# Patient Record
Sex: MALE | Race: WHITE
[De-identification: names, ages, dates, MRNs, and addresses within clinical notes are randomized per-mention and may not be internally consistent; named-entity substitution may affect disease eponyms.]

---

## 2018-03-31 ENCOUNTER — HOSPITAL ENCOUNTER (EMERGENCY)
Dept: HOSPITAL 45 - C.EDB | Age: 23
Discharge: HOME | End: 2018-03-31
Payer: COMMERCIAL

## 2018-03-31 VITALS
BODY MASS INDEX: 22.34 KG/M2 | HEIGHT: 62.99 IN | WEIGHT: 126.1 LBS | WEIGHT: 126.1 LBS | HEIGHT: 62.99 IN | BODY MASS INDEX: 22.34 KG/M2

## 2018-03-31 VITALS
OXYGEN SATURATION: 95 % | DIASTOLIC BLOOD PRESSURE: 58 MMHG | TEMPERATURE: 98.78 F | SYSTOLIC BLOOD PRESSURE: 114 MMHG | HEART RATE: 96 BPM

## 2018-03-31 DIAGNOSIS — R50.9: Primary | ICD-10-CM

## 2018-03-31 DIAGNOSIS — R05: ICD-10-CM

## 2018-03-31 DIAGNOSIS — M79.1: ICD-10-CM

## 2018-03-31 DIAGNOSIS — J02.9: ICD-10-CM

## 2018-03-31 LAB — INFLUENZA B ANTIGEN: (no result)

## 2018-03-31 NOTE — DIAGNOSTIC IMAGING REPORT
TWO VIEW CHEST



CLINICAL HISTORY: Cough and fever.



FINDINGS: PA and lateral chest radiographs are compared to study dated

5/15/2015. The cardiomediastinal silhouette is unremarkable.  The lungs and

pleural spaces are clear. There is no pneumothorax. The bony thorax appears

intact.



IMPRESSION: No active disease in the chest.







Electronically signed by:  Sanjay Landin M.D.

3/31/2018 7:49 AM



Dictated Date/Time:  3/31/2018 7:48 AM

## 2018-03-31 NOTE — EMERGENCY ROOM VISIT NOTE
History


First contact with patient:  07:04


Chief Complaint:  FLU LIKE SX


Stated Complaint:  FLU,FEVER,COUGH





History of Present Illness


The patient is a 22 year old male who presents to the Emergency Room with 

complaints of flulike symptoms which started 1 day ago.  The patient reports he 

has had fevers, body aches, sore throat and cough for the past 1 day.  Symptoms 

started suddenly.  He rates his discomfort at 5/10.  He denies any ill 

contacts.  He denies any foreign travel.  He has been taking NyQuil at home for 

his symptoms without relief.  He denies shortness of breath, nausea/vomiting, 

headache, neck pain or stiffness.





Review of Systems


A complete 10 point review of systems was reviewed with the patient with 

pertinent positives and negatives as per history of present illness. All else 

were negative.





Past Medical/Surgical History


Medical Problems:


(1) No Known Active Medical Problems








Family History





Patient reports no known family medical history.





Social History


Smoking Status:  Never Smoker


Alcohol Use:  none


Housing Status:  lives with roommate


Occupation Status:  [employer] student





Current/Historical Medications


No Active Prescriptions or Reported Meds





Physical Exam


Vital Signs











  Date Time  Temp Pulse Resp B/P (MAP) Pulse Ox O2 Delivery O2 Flow Rate FiO2


 


3/31/18 07:59 37.1 96 18 114/58 95 Room Air  


 


3/31/18 06:34 38.2 116 20 123/64 95 Room Air  











Physical Exam


VITALS: Vitals are noted on the nurse's note and reviewed by myself.  Vital 

signs stable.


GENERAL: This is a 22-year-old male, in no acute distress, nondiaphoretic, well-

developed well-nourished.


SKIN: The skin was without rashes.


EARS: External auditory canals clear, tympanic membranes pearly gray without 

erythema or effusion bilaterally.


EYES: Pupils equal round and reactive to light and accommodation.  


NOSE: Patent, turbinates without inflammation or discharge. 


MOUTH: Mucous membranes moist.  Tonsils are not enlarged.  Pharynx without 

erythema or exudate. 


NECK: Supple without nuchal rigidity.  No lymphadenopathy.  


HEART: Regular rate and rhythm without murmurs gallops or rubs.


LUNGS: Clear to auscultation bilaterally without wheezes, rales or rhonchi.  No 

retractions or accessory muscle use.


NEURO: Patient was alert and oriented to person place and time.





Medical Decision & Procedures


ER Provider


Diagnostic Interpretation:


TWO VIEW CHEST





CLINICAL HISTORY: Cough and fever.





FINDINGS: PA and lateral chest radiographs are compared to study dated


5/15/2015. The cardiomediastinal silhouette is unremarkable.  The lungs and


pleural spaces are clear. There is no pneumothorax. The bony thorax appears


intact.





IMPRESSION: No active disease in the chest.





Laboratory Results











Test


  3/31/18


06:45


 


Influenza Type A Antigen


  Neg for Influ


A (NEG)


 


Influenza Type B Antigen


  Neg for Influ


B (NEG)











Medications Administered











 Medications


  (Trade)  Dose


 Ordered  Sig/Lynne


 Route  Start Time


 Stop Time Status Last Admin


Dose Admin


 


 Ibuprofen


  (Advil Tab)  600 mg  STK-MED ONCE


 .ROUTE  3/31/18 06:41


 3/31/18 06:42 DC 3/31/18 06:43


600 MG


 


 Acetaminophen


  (Tylenol Tab)  1,000 mg  NOW  STAT


 PO  3/31/18 07:22


 3/31/18 07:23 DC 3/31/18 07:33


1,000 MG











Medical Decision


Differential diagnosis includes influenza, pneumonia, viral illness, upper 

respiratory infection, strep pharyngitis, among others.





The patient was divided as above.  He presents with 1 day of flulike symptoms.  

Chest x-ray negative for pneumonia.  Rapid influenza testing was negative.  

Strep seems unlikely given lack of Centor criteria.  Patient was treated with 

Tylenol and ibuprofen with improvement of vital signs.  He was counseled on 

symptomatic treatment and advised to alternate Tylenol and ibuprofen as well as 

drink plenty of fluids.  He will follow-up with [facility] as 

needed.  He verbalized understanding of my assessment and treatment plan and 

was discharged home in good condition.





Medication Reconcilliation


Current Medication List:  was personally reviewed by me





Blood Pressure Screening


Patient's blood pressure:  Normal blood pressure





Impression





 Primary Impression:  


 Influenza-like symptoms





Departure Information


Dispostion


Home / Self-Care





Condition


GOOD





Prescriptions





No Active Prescriptions or Reported Meds





Referrals


No Doctor, Assigned (PCP)





Patient Instructions


My [facility]





Additional Instructions





You were treated today for a viral flulike illness.  This will likely resolve 

within 1 week without any treatment.





For pain/fever control, you can use the following over-the-counter medicines (

if >13 yo):





-Extra strength (500 mg/tab) Tylenol (acetaminophen) 2 tabs every 6 hours as 

needed.  Avoid taking more than 4 grams (4000 mg) of Tylenol per day. This 

includes any other sources of acetaminophen you may take on a regular basis.





- Regular strength (200 mg/tab) Advil (ibuprofen) 3-4 tabs every 6 hours as 

needed. Do not exceed a dose of 3200 mg per day.





Make sure to rest and drink plenty of fluids.





Follow-up with [facility] for recheck in 3-4 days if symptoms 

are not improving.





Return to the emergency department with shortness of breath, significantly 

worsening symptoms, vomiting or other new/concerning symptoms.